# Patient Record
Sex: MALE | Race: WHITE | Employment: FULL TIME | URBAN - METROPOLITAN AREA
[De-identification: names, ages, dates, MRNs, and addresses within clinical notes are randomized per-mention and may not be internally consistent; named-entity substitution may affect disease eponyms.]

---

## 2021-11-22 ENCOUNTER — HOSPITAL ENCOUNTER (EMERGENCY)
Facility: HOSPITAL | Age: 61
Discharge: HOME | End: 2021-11-22
Attending: EMERGENCY MEDICINE
Payer: OTHER MISCELLANEOUS

## 2021-11-22 ENCOUNTER — APPOINTMENT (OUTPATIENT)
Dept: GENERAL RADIOLOGY | Facility: HOSPITAL | Age: 61
End: 2021-11-22
Attending: EMERGENCY MEDICINE
Payer: OTHER MISCELLANEOUS

## 2021-11-22 VITALS
WEIGHT: 185 LBS | SYSTOLIC BLOOD PRESSURE: 146 MMHG | DIASTOLIC BLOOD PRESSURE: 76 MMHG | HEIGHT: 68 IN | RESPIRATION RATE: 27 BRPM | HEART RATE: 88 BPM | TEMPERATURE: 99 F | BODY MASS INDEX: 28.04 KG/M2 | OXYGEN SATURATION: 100 %

## 2021-11-22 DIAGNOSIS — S43.015A ANTERIOR DISLOCATION OF LEFT SHOULDER, INITIAL ENCOUNTER: Primary | ICD-10-CM

## 2021-11-22 PROCEDURE — 23650 CLTX SHO DSLC W/MNPJ WO ANES: CPT

## 2021-11-22 PROCEDURE — 99285 EMERGENCY DEPT VISIT HI MDM: CPT

## 2021-11-22 PROCEDURE — 96374 THER/PROPH/DIAG INJ IV PUSH: CPT

## 2021-11-22 PROCEDURE — 73030 X-RAY EXAM OF SHOULDER: CPT | Performed by: EMERGENCY MEDICINE

## 2021-11-22 PROCEDURE — 96375 TX/PRO/DX INJ NEW DRUG ADDON: CPT

## 2021-11-22 PROCEDURE — 80307 DRUG TEST PRSMV CHEM ANLYZR: CPT | Performed by: EMERGENCY MEDICINE

## 2021-11-22 RX ORDER — HYDROMORPHONE HYDROCHLORIDE 1 MG/ML
1 INJECTION, SOLUTION INTRAMUSCULAR; INTRAVENOUS; SUBCUTANEOUS EVERY 30 MIN PRN
Status: DISCONTINUED | OUTPATIENT
Start: 2021-11-22 | End: 2021-11-22

## 2021-11-22 RX ORDER — HYDROCODONE BITARTRATE AND ACETAMINOPHEN 5; 325 MG/1; MG/1
1-2 TABLET ORAL EVERY 6 HOURS PRN
Qty: 20 TABLET | Refills: 0 | Status: SHIPPED | OUTPATIENT
Start: 2021-11-22 | End: 2021-12-02

## 2021-11-22 NOTE — ED PROVIDER NOTES
Patient Seen in: [facility] Emergency Department      History   Patient presents with:  Fall  Arm or Hand Injury    Stated Complaint: tripped and fell, pain to L arm     Subjective:   HPI    Patient had a trip and fall at work.   He tripped falling f normal.  Neck: Nontender with good active nontender range of motion  Lungs: Clear to auscultation bilaterally. No rhonchi or rales. Abdomen: Soft, nondistended. Nontender  Extremities: Step-off deformity noted of the left shoulder.   No tenderness over t revealed good reduction.      X-ray shoulder  CONCLUSION:    Two views demonstrate that the previously demonstrated anterior shoulder dislocation has been reduced.  The glenohumeral relationship on the two views appears appropriate.  No definite fractures s

## 2021-11-22 NOTE — RESPIRATORY THERAPY NOTE
In room for reduction left shoulder. End tidal on reading 28 patient did have ongoing periods of apnea. Ambu/mask ventilation initiated with no lose of sao2. Patient breathing better after several minutes of obsservation and waking patient.

## 2021-11-22 NOTE — ED INITIAL ASSESSMENT (HPI)
Pt tripped over curb, had something in L hand that he tried bracing fall with but it slid and extended L arm over head.  No loc, did not hit head, coworker at bedside states he had a few moments of not responding to him but would then be awake and alert and